# Patient Record
Sex: FEMALE | Race: WHITE
[De-identification: names, ages, dates, MRNs, and addresses within clinical notes are randomized per-mention and may not be internally consistent; named-entity substitution may affect disease eponyms.]

---

## 2019-10-14 ENCOUNTER — HOSPITAL ENCOUNTER (OUTPATIENT)
Dept: HOSPITAL 83 - LAB | Age: 34
Discharge: HOME | End: 2019-10-14
Attending: FAMILY MEDICINE
Payer: COMMERCIAL

## 2019-10-14 DIAGNOSIS — L98.8: ICD-10-CM

## 2019-10-14 DIAGNOSIS — R00.1: ICD-10-CM

## 2019-10-14 DIAGNOSIS — R53.83: ICD-10-CM

## 2019-10-14 DIAGNOSIS — E55.9: Primary | ICD-10-CM

## 2019-10-14 DIAGNOSIS — E78.00: ICD-10-CM

## 2019-10-14 LAB
ALBUMIN SERPL-MCNC: 3.7 GM/DL (ref 3.1–4.5)
ALP SERPL-CCNC: 49 U/L (ref 45–117)
ALT SERPL W P-5'-P-CCNC: 20 U/L (ref 12–78)
AST SERPL-CCNC: 10 IU/L (ref 3–35)
BUN SERPL-MCNC: 13 MG/DL (ref 7–24)
CHLORIDE SERPL-SCNC: 104 MMOL/L (ref 98–107)
CHOLEST SERPL-MCNC: 137 MG/DL (ref ?–200)
CREAT SERPL-MCNC: 0.67 MG/DL (ref 0.55–1.02)
ERYTHROCYTE [DISTWIDTH] IN BLOOD BY AUTOMATED COUNT: 12.3 % (ref 0–14.5)
HCT VFR BLD AUTO: 38.9 % (ref 37–47)
HDLC SERPL-MCNC: 42 MG/DL (ref 40–60)
HGB BLD-MCNC: 12.6 G/DL (ref 12–16)
LDLC SERPL DIRECT ASSAY-MCNC: 86 MG/DL (ref 9–159)
MCH RBC QN AUTO: 28.2 PG (ref 27–31)
MCHC RBC AUTO-ENTMCNC: 32.4 G/DL (ref 33–37)
MCV RBC AUTO: 87 FL (ref 81–99)
NRBC BLD QL AUTO: 0 10*3/UL (ref 0–0)
PLATELET # BLD AUTO: 278 10*3/UL (ref 130–400)
PMV BLD AUTO: 9.7 FL (ref 9.6–12.3)
POTASSIUM SERPL-SCNC: 3.7 MMOL/L (ref 3.5–5.1)
PROT SERPL-MCNC: 7.5 GM/DL (ref 6.4–8.2)
RBC # BLD AUTO: 4.47 10*6/UL (ref 4.1–5.1)
SODIUM SERPL-SCNC: 136 MMOL/L (ref 136–145)
TRIGL SERPL-MCNC: 43 MG/DL (ref ?–150)
TSH SERPL DL<=0.005 MIU/L-ACNC: 1.57 UIU/ML (ref 0.36–4.75)
VLDLC SERPL CALC-MCNC: 9 MG/DL (ref 6–40)
WBC NRBC COR # BLD AUTO: 8.8 10*3/UL (ref 4.8–10.8)

## 2019-10-16 LAB — TESTOST FREE SERPL-MCNC: 1.2 PG/ML (ref 0–4.2)

## 2019-10-30 ENCOUNTER — HOSPITAL ENCOUNTER (OUTPATIENT)
Dept: HOSPITAL 83 - CARD | Age: 34
Discharge: HOME | End: 2019-10-30
Attending: INTERNAL MEDICINE
Payer: COMMERCIAL

## 2019-10-30 DIAGNOSIS — R00.1: Primary | ICD-10-CM

## 2019-12-02 ENCOUNTER — HOSPITAL ENCOUNTER (OUTPATIENT)
Dept: HOSPITAL 83 - US | Age: 34
Discharge: HOME | End: 2019-12-02
Attending: NURSE PRACTITIONER
Payer: COMMERCIAL

## 2019-12-02 DIAGNOSIS — Z34.81: Primary | ICD-10-CM

## 2019-12-02 DIAGNOSIS — Z3A.14: ICD-10-CM

## 2020-01-13 ENCOUNTER — HOSPITAL ENCOUNTER (OUTPATIENT)
Dept: HOSPITAL 83 - US | Age: 35
Discharge: HOME | End: 2020-01-13
Attending: NURSE PRACTITIONER
Payer: COMMERCIAL

## 2020-01-13 DIAGNOSIS — Z34.82: Primary | ICD-10-CM

## 2020-01-13 DIAGNOSIS — Z3A.20: ICD-10-CM

## 2020-05-27 ENCOUNTER — TELEPHONE (OUTPATIENT)
Dept: LABOR AND DELIVERY | Age: 35
End: 2020-05-27

## 2020-05-30 ENCOUNTER — HOSPITAL ENCOUNTER (OUTPATIENT)
Age: 35
Discharge: HOME | End: 2020-06-01
Payer: COMMERCIAL

## 2020-05-30 PROCEDURE — U0003 INFECTIOUS AGENT DETECTION BY NUCLEIC ACID (DNA OR RNA); SEVERE ACUTE RESPIRATORY SYNDROME CORONAVIRUS 2 (SARS-COV-2) (CORONAVIRUS DISEASE [COVID-19]), AMPLIFIED PROBE TECHNIQUE, MAKING USE OF HIGH THROUGHPUT TECHNOLOGIES AS DESCRIBED BY CMS-2020-01-R: HCPCS

## 2020-05-31 ENCOUNTER — HOSPITAL ENCOUNTER (OUTPATIENT)
Age: 35
Discharge: HOME | End: 2020-06-02
Payer: COMMERCIAL

## 2020-06-02 ENCOUNTER — TELEPHONE (OUTPATIENT)
Dept: LABOR AND DELIVERY | Age: 35
End: 2020-06-02

## 2020-06-02 ENCOUNTER — ANESTHESIA EVENT (OUTPATIENT)
Dept: LABOR AND DELIVERY | Age: 35
End: 2020-06-02
Payer: COMMERCIAL

## 2020-06-02 ENCOUNTER — ANESTHESIA (OUTPATIENT)
Dept: LABOR AND DELIVERY | Age: 35
End: 2020-06-02
Payer: COMMERCIAL

## 2020-06-02 ENCOUNTER — HOSPITAL ENCOUNTER (INPATIENT)
Age: 35
LOS: 2 days | Discharge: HOME | End: 2020-06-04
Attending: OBSTETRICS & GYNECOLOGY | Admitting: OBSTETRICS & GYNECOLOGY
Payer: COMMERCIAL

## 2020-06-02 PROBLEM — Z3A.39 39 WEEKS GESTATION OF PREGNANCY: Status: ACTIVE | Noted: 2020-06-02

## 2020-06-02 LAB
ABO/RH: NORMAL
AMPHETAMINE SCREEN, URINE: NOT DETECTED
ANTIBODY SCREEN: NORMAL
BARBITURATE SCREEN URINE: NOT DETECTED
BENZODIAZEPINE SCREEN, URINE: NOT DETECTED
CANNABINOID SCREEN URINE: NOT DETECTED
COCAINE METABOLITE SCREEN URINE: NOT DETECTED
FENTANYL SCREEN, URINE: NOT DETECTED
HCT VFR BLD CALC: 36.9 % (ref 34–48)
HEMOGLOBIN: 12.6 G/DL (ref 11.5–15.5)
Lab: NORMAL
MCH RBC QN AUTO: 30.3 PG (ref 26–35)
MCHC RBC AUTO-ENTMCNC: 34.1 % (ref 32–34.5)
MCV RBC AUTO: 88.7 FL (ref 80–99.9)
METHADONE SCREEN, URINE: NOT DETECTED
OPIATE SCREEN URINE: NOT DETECTED
OXYCODONE URINE: NOT DETECTED
PDW BLD-RTO: 12.9 FL (ref 11.5–15)
PHENCYCLIDINE SCREEN URINE: NOT DETECTED
PLATELET # BLD: 218 E9/L (ref 130–450)
PMV BLD AUTO: 11 FL (ref 7–12)
RBC # BLD: 4.16 E12/L (ref 3.5–5.5)
WBC # BLD: 13 E9/L (ref 4.5–11.5)

## 2020-06-02 PROCEDURE — 86900 BLOOD TYPING SEROLOGIC ABO: CPT

## 2020-06-02 PROCEDURE — 86850 RBC ANTIBODY SCREEN: CPT

## 2020-06-02 PROCEDURE — 36415 COLL VENOUS BLD VENIPUNCTURE: CPT

## 2020-06-02 PROCEDURE — 6370000000 HC RX 637 (ALT 250 FOR IP): Performed by: OBSTETRICS & GYNECOLOGY

## 2020-06-02 PROCEDURE — 85027 COMPLETE CBC AUTOMATED: CPT

## 2020-06-02 PROCEDURE — 80307 DRUG TEST PRSMV CHEM ANLYZR: CPT

## 2020-06-02 PROCEDURE — 3E033VJ INTRODUCTION OF OTHER HORMONE INTO PERIPHERAL VEIN, PERCUTANEOUS APPROACH: ICD-10-PCS | Performed by: OBSTETRICS & GYNECOLOGY

## 2020-06-02 PROCEDURE — 1220000001 HC SEMI PRIVATE L&D R&B

## 2020-06-02 PROCEDURE — 2580000003 HC RX 258: Performed by: OBSTETRICS & GYNECOLOGY

## 2020-06-02 PROCEDURE — 86901 BLOOD TYPING SEROLOGIC RH(D): CPT

## 2020-06-02 RX ORDER — ONDANSETRON 2 MG/ML
4 INJECTION INTRAMUSCULAR; INTRAVENOUS EVERY 6 HOURS PRN
Status: DISCONTINUED | OUTPATIENT
Start: 2020-06-02 | End: 2020-06-04 | Stop reason: HOSPADM

## 2020-06-02 RX ORDER — MULTIVIT-MIN/IRON/FOLIC ACID/K 18-600-40
CAPSULE ORAL
COMMUNITY

## 2020-06-02 RX ORDER — SODIUM CHLORIDE, SODIUM LACTATE, POTASSIUM CHLORIDE, CALCIUM CHLORIDE 600; 310; 30; 20 MG/100ML; MG/100ML; MG/100ML; MG/100ML
INJECTION, SOLUTION INTRAVENOUS CONTINUOUS
Status: DISCONTINUED | OUTPATIENT
Start: 2020-06-02 | End: 2020-06-04 | Stop reason: HOSPADM

## 2020-06-02 RX ADMIN — Medication 25 MCG: at 22:00

## 2020-06-02 RX ADMIN — SODIUM CHLORIDE, POTASSIUM CHLORIDE, SODIUM LACTATE AND CALCIUM CHLORIDE: 600; 310; 30; 20 INJECTION, SOLUTION INTRAVENOUS at 21:30

## 2020-06-02 ASSESSMENT — LIFESTYLE VARIABLES: SMOKING_STATUS: 1

## 2020-06-02 NOTE — TELEPHONE ENCOUNTER
[facility] [employer] office. I talked to [facility] at the . She stated that the [facility] office was unaware of the pre covid19 testing. She stated Ashu's other office was probably aware, but not Valerie Prophet. I informed her of the testing required and that all c-sections needs this done 4 days prior to the surgery, will need to give everyone a order to get this done. She is stated will fax me a order today for this patient.

## 2020-06-03 PROCEDURE — 2580000003 HC RX 258: Performed by: OBSTETRICS & GYNECOLOGY

## 2020-06-03 PROCEDURE — 6360000002 HC RX W HCPCS: Performed by: OBSTETRICS & GYNECOLOGY

## 2020-06-03 PROCEDURE — 2500000003 HC RX 250 WO HCPCS: Performed by: ANESTHESIOLOGY

## 2020-06-03 PROCEDURE — 3700000025 EPIDURAL BLOCK: Performed by: ANESTHESIOLOGY

## 2020-06-03 PROCEDURE — 6370000000 HC RX 637 (ALT 250 FOR IP): Performed by: OBSTETRICS & GYNECOLOGY

## 2020-06-03 PROCEDURE — 2500000003 HC RX 250 WO HCPCS: Performed by: REGISTERED NURSE

## 2020-06-03 PROCEDURE — 7200000001 HC VAGINAL DELIVERY

## 2020-06-03 PROCEDURE — 1220000000 HC SEMI PRIVATE OB R&B

## 2020-06-03 RX ORDER — IBUPROFEN 800 MG/1
800 TABLET ORAL EVERY 8 HOURS
Status: DISCONTINUED | OUTPATIENT
Start: 2020-06-04 | End: 2020-06-04 | Stop reason: HOSPADM

## 2020-06-03 RX ORDER — SIMETHICONE 80 MG
80 TABLET,CHEWABLE ORAL EVERY 6 HOURS PRN
Status: DISCONTINUED | OUTPATIENT
Start: 2020-06-03 | End: 2020-06-04 | Stop reason: HOSPADM

## 2020-06-03 RX ORDER — EPHEDRINE SULFATE/0.9% NACL/PF 50 MG/5 ML
5 SYRINGE (ML) INTRAVENOUS PRN
Status: DISCONTINUED | OUTPATIENT
Start: 2020-06-03 | End: 2020-06-04 | Stop reason: HOSPADM

## 2020-06-03 RX ORDER — ONDANSETRON 2 MG/ML
4 INJECTION INTRAMUSCULAR; INTRAVENOUS EVERY 6 HOURS PRN
Status: DISCONTINUED | OUTPATIENT
Start: 2020-06-03 | End: 2020-06-03 | Stop reason: HOSPADM

## 2020-06-03 RX ORDER — NALOXONE HYDROCHLORIDE 0.4 MG/ML
0.4 INJECTION, SOLUTION INTRAMUSCULAR; INTRAVENOUS; SUBCUTANEOUS PRN
Status: DISCONTINUED | OUTPATIENT
Start: 2020-06-03 | End: 2020-06-03 | Stop reason: HOSPADM

## 2020-06-03 RX ORDER — SODIUM CHLORIDE 0.9 % (FLUSH) 0.9 %
10 SYRINGE (ML) INJECTION EVERY 12 HOURS SCHEDULED
Status: DISCONTINUED | OUTPATIENT
Start: 2020-06-03 | End: 2020-06-04 | Stop reason: HOSPADM

## 2020-06-03 RX ORDER — SODIUM CHLORIDE 0.9 % (FLUSH) 0.9 %
10 SYRINGE (ML) INJECTION PRN
Status: DISCONTINUED | OUTPATIENT
Start: 2020-06-03 | End: 2020-06-04 | Stop reason: HOSPADM

## 2020-06-03 RX ORDER — ONDANSETRON 2 MG/ML
4 INJECTION INTRAMUSCULAR; INTRAVENOUS EVERY 6 HOURS PRN
Status: DISCONTINUED | OUTPATIENT
Start: 2020-06-03 | End: 2020-06-04 | Stop reason: HOSPADM

## 2020-06-03 RX ORDER — LIDOCAINE HYDROCHLORIDE 10 MG/ML
INJECTION, SOLUTION INFILTRATION; PERINEURAL
Status: DISPENSED
Start: 2020-06-03 | End: 2020-06-03

## 2020-06-03 RX ORDER — ACETAMINOPHEN 325 MG/1
650 TABLET ORAL EVERY 4 HOURS PRN
Status: DISCONTINUED | OUTPATIENT
Start: 2020-06-03 | End: 2020-06-04 | Stop reason: HOSPADM

## 2020-06-03 RX ORDER — FERROUS SULFATE 325(65) MG
325 TABLET ORAL 2 TIMES DAILY WITH MEALS
Status: DISCONTINUED | OUTPATIENT
Start: 2020-06-03 | End: 2020-06-04 | Stop reason: HOSPADM

## 2020-06-03 RX ORDER — SODIUM CHLORIDE, SODIUM LACTATE, POTASSIUM CHLORIDE, CALCIUM CHLORIDE 600; 310; 30; 20 MG/100ML; MG/100ML; MG/100ML; MG/100ML
INJECTION, SOLUTION INTRAVENOUS CONTINUOUS
Status: DISCONTINUED | OUTPATIENT
Start: 2020-06-03 | End: 2020-06-04 | Stop reason: HOSPADM

## 2020-06-03 RX ORDER — HYDROCODONE BITARTRATE AND ACETAMINOPHEN 5; 325 MG/1; MG/1
2 TABLET ORAL EVERY 4 HOURS PRN
Status: DISCONTINUED | OUTPATIENT
Start: 2020-06-03 | End: 2020-06-04 | Stop reason: HOSPADM

## 2020-06-03 RX ORDER — LIDOCAINE HYDROCHLORIDE AND EPINEPHRINE 15; 5 MG/ML; UG/ML
INJECTION, SOLUTION EPIDURAL PRN
Status: DISCONTINUED | OUTPATIENT
Start: 2020-06-03 | End: 2020-06-03 | Stop reason: SDUPTHER

## 2020-06-03 RX ORDER — BISACODYL 10 MG
10 SUPPOSITORY, RECTAL RECTAL DAILY PRN
Status: DISCONTINUED | OUTPATIENT
Start: 2020-06-03 | End: 2020-06-04 | Stop reason: HOSPADM

## 2020-06-03 RX ORDER — HYDROCODONE BITARTRATE AND ACETAMINOPHEN 5; 325 MG/1; MG/1
1 TABLET ORAL EVERY 4 HOURS PRN
Status: DISCONTINUED | OUTPATIENT
Start: 2020-06-03 | End: 2020-06-04 | Stop reason: HOSPADM

## 2020-06-03 RX ORDER — ONDANSETRON 4 MG/1
8 TABLET, FILM COATED ORAL EVERY 8 HOURS PRN
Status: DISCONTINUED | OUTPATIENT
Start: 2020-06-03 | End: 2020-06-04 | Stop reason: HOSPADM

## 2020-06-03 RX ORDER — NALBUPHINE HCL 10 MG/ML
5 AMPUL (ML) INJECTION EVERY 4 HOURS PRN
Status: DISCONTINUED | OUTPATIENT
Start: 2020-06-03 | End: 2020-06-03 | Stop reason: RX

## 2020-06-03 RX ORDER — ACETAMINOPHEN 650 MG
TABLET, EXTENDED RELEASE ORAL
Status: DISPENSED
Start: 2020-06-03 | End: 2020-06-03

## 2020-06-03 RX ORDER — DOCUSATE SODIUM 100 MG/1
100 CAPSULE, LIQUID FILLED ORAL 2 TIMES DAILY
Status: DISCONTINUED | OUTPATIENT
Start: 2020-06-03 | End: 2020-06-04 | Stop reason: HOSPADM

## 2020-06-03 RX ORDER — MODIFIED LANOLIN
OINTMENT (GRAM) TOPICAL PRN
Status: DISCONTINUED | OUTPATIENT
Start: 2020-06-03 | End: 2020-06-04 | Stop reason: HOSPADM

## 2020-06-03 RX ADMIN — Medication 1 MILLI-UNITS/MIN: at 06:05

## 2020-06-03 RX ADMIN — Medication 15 ML/HR: at 10:46

## 2020-06-03 RX ADMIN — Medication 10 ML: at 20:02

## 2020-06-03 RX ADMIN — Medication 5 ML: at 10:47

## 2020-06-03 RX ADMIN — IBUPROFEN 800 MG: 800 TABLET, FILM COATED ORAL at 23:27

## 2020-06-03 RX ADMIN — BUTORPHANOL TARTRATE 2 MG: 2 INJECTION, SOLUTION INTRAMUSCULAR; INTRAVENOUS at 08:14

## 2020-06-03 RX ADMIN — Medication 25 MCG: at 02:00

## 2020-06-03 RX ADMIN — Medication 10 ML: at 10:44

## 2020-06-03 RX ADMIN — DOCUSATE SODIUM 100 MG: 100 CAPSULE, LIQUID FILLED ORAL at 20:03

## 2020-06-03 RX ADMIN — LIDOCAINE HYDROCHLORIDE,EPINEPHRINE BITARTRATE 3 ML: 15; .005 INJECTION, SOLUTION EPIDURAL; INFILTRATION; INTRACAUDAL; PERINEURAL at 10:42

## 2020-06-03 ASSESSMENT — PAIN DESCRIPTION - FREQUENCY: FREQUENCY: INTERMITTENT

## 2020-06-03 ASSESSMENT — PAIN DESCRIPTION - DESCRIPTORS
DESCRIPTORS: CRAMPING
DESCRIPTORS: CRAMPING

## 2020-06-03 ASSESSMENT — PAIN DESCRIPTION - ORIENTATION: ORIENTATION: LOWER

## 2020-06-03 ASSESSMENT — PAIN SCALES - GENERAL
PAINLEVEL_OUTOF10: 7
PAINLEVEL_OUTOF10: 0
PAINLEVEL_OUTOF10: 3

## 2020-06-03 ASSESSMENT — PAIN DESCRIPTION - PROGRESSION: CLINICAL_PROGRESSION: GRADUALLY WORSENING

## 2020-06-03 ASSESSMENT — PAIN DESCRIPTION - LOCATION: LOCATION: ABDOMEN

## 2020-06-03 ASSESSMENT — PAIN DESCRIPTION - PAIN TYPE: TYPE: ACUTE PAIN

## 2020-06-03 NOTE — H&P
HISTORY OF PRESENT ILLNESS:      The patient is a 29 y.o. female at 36w2d. OB History        2    Para   1    Term   1            AB        Living   1       SAB        TAB        Ectopic        Molar        Multiple        Live Births   1            Patient presents with a chief complaint as above and is being admitted for induction    Estimated Due Date: Estimated Date of Delivery: 20    PRENATAL CARE:    Complicated by: none    PAST OB HISTORY  OB History        2    Para   1    Term   1            AB        Living   1       SAB        TAB        Ectopic        Molar        Multiple        Live Births   1                Past Medical History:    History reviewed. No pertinent past medical history. Past Surgical History:        Procedure Laterality Date    TONSILLECTOMY       Allergies:  Patient has no known allergies.   Social History:    Social History     Socioeconomic History    Marital status:      Spouse name: Not on file    Number of children: Not on file    Years of education: Not on file    Highest education level: Not on file   Occupational History    Not on file   Social Needs    Financial resource strain: Not on file    Food insecurity     Worry: Not on file     Inability: Not on file    Transportation needs     Medical: Not on file     Non-medical: Not on file   Tobacco Use    Smoking status: Former Smoker    Smokeless tobacco: Never Used   Substance and Sexual Activity    Alcohol use: Not Currently    Drug use: Never    Sexual activity: Yes     Partners: Male   Lifestyle    Physical activity     Days per week: Not on file     Minutes per session: Not on file    Stress: Not on file   Relationships    Social connections     Talks on phone: Not on file     Gets together: Not on file     Attends [employer] service: Not on file     Active member of club or organization: Not on file     Attends meetings of clubs or organizations: Not on file

## 2020-06-03 NOTE — ANESTHESIA PRE PROCEDURE
Department of Anesthesiology  Preprocedure Note       Name:  Ariana Ortiz   Age:  29 y.o.  :  1985                                          MRN:  [id number]         Date:  2020      Surgeon: * Surgery not found *    Procedure:     Medications prior to admission:   Prior to Admission medications    Medication Sig Start Date End Date Taking? Authorizing Provider   Cholecalciferol (VITAMIN D) 50 MCG ( UT) CAPS capsule Take by mouth   Yes Historical Provider, MD   Prenatal MV-Min-Fe Fum-FA-DHA (PRENATAL 1 PO) Take by mouth   Yes Historical Provider, MD       Current medications:    Current Facility-Administered Medications   Medication Dose Route Frequency Provider Last Rate Last Dose    lactated ringers infusion   Intravenous Continuous Mallie Ee, DO        ondansetron [facility] injection 4 mg  4 mg Intravenous Q6H PRN Mallie Ee, DO        oxytocin (PITOCIN) 30 units in 500 mL infusion  1 kory-units/min Intravenous Continuous PRN Mallie Ee, DO           Allergies:  No Known Allergies    Problem List:    Patient Active Problem List   Diagnosis Code    39 weeks gestation of pregnancy Z3A.39       Past Medical History:  History reviewed. No pertinent past medical history.     Past Surgical History:        Procedure Laterality Date    TONSILLECTOMY         Social History:    Social History     Tobacco Use    Smoking status: Former Smoker    Smokeless tobacco: Never Used   Substance Use Topics    Alcohol use: Not Currently                                Counseling given: Not Answered      Vital Signs (Current):   Vitals:    20 2121   BP: 126/68   Pulse: 50   Resp: 18   Temp: 36.7 °C (98.1 °F)   TempSrc: Oral                                              BP Readings from Last 3 Encounters:   20 126/68       NPO Status:                                                                                 BMI:   Wt Readings from Last 3 Encounters:   No data found for [employer]

## 2020-06-03 NOTE — PROGRESS NOTES
Dr. Desiree Scales called in, updated on FHTs and ctx pattern, states to check patient at 0600 and call with cervical check before next cytotec.

## 2020-06-03 NOTE — PROGRESS NOTES
Patient oriented to room 313, call light and telephone usage shown to the patient. New admission vital signs and assessment completed as charted. New admission paperwork gone over with the patient including the TDAP and Hepatitis B vaccines. The patient is refusing the TDAP vaccine as she has already had it, and consented to the baby to receiving the Hepatitis B vaccine. Instructed the patient to call for first time out of bed. All questions answered, will monitor.

## 2020-06-04 VITALS
RESPIRATION RATE: 18 BRPM | TEMPERATURE: 98 F | WEIGHT: 220 LBS | BODY MASS INDEX: 34.53 KG/M2 | SYSTOLIC BLOOD PRESSURE: 123 MMHG | HEART RATE: 55 BPM | DIASTOLIC BLOOD PRESSURE: 60 MMHG | HEIGHT: 67 IN

## 2020-06-04 LAB
HCT VFR BLD CALC: 36.9 % (ref 34–48)
HEMOGLOBIN: 12.2 G/DL (ref 11.5–15.5)
SARS-COV-2: NOT DETECTED
SOURCE: NORMAL

## 2020-06-04 PROCEDURE — 36415 COLL VENOUS BLD VENIPUNCTURE: CPT

## 2020-06-04 PROCEDURE — 85018 HEMOGLOBIN: CPT

## 2020-06-04 PROCEDURE — 85014 HEMATOCRIT: CPT

## 2020-06-04 PROCEDURE — 6370000000 HC RX 637 (ALT 250 FOR IP): Performed by: OBSTETRICS & GYNECOLOGY

## 2020-06-04 RX ORDER — IBUPROFEN 800 MG/1
800 TABLET ORAL EVERY 8 HOURS PRN
Qty: 24 TABLET | Refills: 0 | Status: SHIPPED | OUTPATIENT
Start: 2020-06-04

## 2020-06-04 RX ADMIN — IBUPROFEN 800 MG: 800 TABLET, FILM COATED ORAL at 10:38

## 2020-06-04 RX ADMIN — DOCUSATE SODIUM 100 MG: 100 CAPSULE, LIQUID FILLED ORAL at 10:37

## 2020-06-04 ASSESSMENT — PAIN SCALES - GENERAL: PAINLEVEL_OUTOF10: 1

## 2020-06-04 NOTE — PROGRESS NOTES
CLINICAL PHARMACY NOTE: MEDS TO [address] Select Patient?: No  Total # of Prescriptions Filled: 1   The following medications were delivered to the patient:  · Ibuprofen 800 mg     Total # of Interventions Completed: 2  Time Spent (min): 30    Additional Documentation:

## 2021-03-11 ENCOUNTER — HOSPITAL ENCOUNTER (OUTPATIENT)
Dept: HOSPITAL 83 - LAB | Age: 36
Discharge: HOME | End: 2021-03-11
Attending: FAMILY MEDICINE
Payer: COMMERCIAL

## 2021-03-11 DIAGNOSIS — E55.9: ICD-10-CM

## 2021-03-11 DIAGNOSIS — R53.83: ICD-10-CM

## 2021-03-11 DIAGNOSIS — R63.5: ICD-10-CM

## 2021-03-11 DIAGNOSIS — E78.00: Primary | ICD-10-CM

## 2021-03-11 LAB
25(OH)D3 SERPL-MCNC: 25.6 NG/ML (ref 30–100)
ALBUMIN SERPL-MCNC: 3.3 GM/DL (ref 3.1–4.5)
ALP SERPL-CCNC: 55 U/L (ref 45–117)
ALT SERPL W P-5'-P-CCNC: 15 U/L (ref 12–78)
AST SERPL-CCNC: 6 IU/L (ref 3–35)
BUN SERPL-MCNC: 15 MG/DL (ref 7–24)
CHLORIDE SERPL-SCNC: 109 MMOL/L (ref 98–107)
CHOLEST SERPL-MCNC: 182 MG/DL (ref ?–200)
CREAT SERPL-MCNC: 0.82 MG/DL (ref 0.55–1.02)
ERYTHROCYTE [DISTWIDTH] IN BLOOD BY AUTOMATED COUNT: 11.7 % (ref 0–14.5)
HCT VFR BLD AUTO: 38.7 % (ref 37–47)
HDLC SERPL-MCNC: 49 MG/DL (ref 40–60)
LDLC SERPL DIRECT ASSAY-MCNC: 108 MG/DL (ref 9–159)
MCH RBC QN AUTO: 28.4 PG (ref 27–31)
MCHC RBC AUTO-ENTMCNC: 32.8 G/DL (ref 33–37)
MCV RBC AUTO: 86.6 FL (ref 81–99)
NRBC BLD QL AUTO: 0 % (ref 0–0)
PLATELET # BLD AUTO: 298 10*3/UL (ref 130–400)
PMV BLD AUTO: 9.5 FL (ref 9.6–12.3)
POTASSIUM SERPL-SCNC: 3.9 MMOL/L (ref 3.5–5.1)
PROT SERPL-MCNC: 7.5 GM/DL (ref 6.4–8.2)
RBC # BLD AUTO: 4.47 10*6/UL (ref 4.1–5.1)
SODIUM SERPL-SCNC: 141 MMOL/L (ref 136–145)
T4 FREE SERPL-MCNC: 0.93 NG/DL (ref 0.76–1.46)
TRIGL SERPL-MCNC: 125 MG/DL (ref ?–150)
TSH SERPL DL<=0.005 MIU/L-ACNC: 2.3 UIU/ML (ref 0.36–4.75)
VITAMIN B12: 411 PG/ML (ref 247–911)
VLDLC SERPL CALC-MCNC: 25 MG/DL (ref 6–40)
WBC NRBC COR # BLD AUTO: 8.4 10*3/UL (ref 4.8–10.8)

## 2022-04-21 ENCOUNTER — HOSPITAL ENCOUNTER (OUTPATIENT)
Dept: HOSPITAL 83 - RAD | Age: 37
Discharge: HOME | End: 2022-04-21
Attending: NURSE PRACTITIONER
Payer: COMMERCIAL

## 2022-04-21 DIAGNOSIS — R00.1: Primary | ICD-10-CM

## 2022-04-21 DIAGNOSIS — M41.84: ICD-10-CM

## 2022-04-21 DIAGNOSIS — Z82.49: ICD-10-CM

## 2022-06-01 ENCOUNTER — HOSPITAL ENCOUNTER (OUTPATIENT)
Dept: HOSPITAL 83 - CARD | Age: 37
Discharge: HOME | End: 2022-06-01
Attending: NURSE PRACTITIONER
Payer: COMMERCIAL

## 2022-06-01 DIAGNOSIS — I08.2: Primary | ICD-10-CM

## 2022-06-01 DIAGNOSIS — Z82.49: ICD-10-CM

## 2022-10-20 LAB — PAP SMEAR, EXTERNAL: NORMAL

## 2023-11-07 ENCOUNTER — HOSPITAL ENCOUNTER (OUTPATIENT)
Dept: HOSPITAL 83 - LAB | Age: 38
Discharge: HOME | End: 2023-11-07
Attending: NURSE PRACTITIONER
Payer: COMMERCIAL

## 2023-11-07 DIAGNOSIS — R05.8: ICD-10-CM

## 2023-11-07 DIAGNOSIS — J18.9: Primary | ICD-10-CM

## 2023-11-18 ENCOUNTER — HOSPITAL ENCOUNTER (OUTPATIENT)
Dept: HOSPITAL 83 - LAB | Age: 38
Discharge: HOME | End: 2023-11-18
Attending: NURSE PRACTITIONER
Payer: COMMERCIAL

## 2023-11-18 DIAGNOSIS — Z83.3: ICD-10-CM

## 2023-11-18 DIAGNOSIS — Z00.01: ICD-10-CM

## 2023-11-18 DIAGNOSIS — Z13.1: ICD-10-CM

## 2023-11-18 DIAGNOSIS — Z13.220: Primary | ICD-10-CM

## 2023-11-18 LAB
ALP SERPL-CCNC: 58 U/L (ref 46–116)
ALT SERPL W P-5'-P-CCNC: 20 U/L (ref 5–49)
BASOPHILS # BLD AUTO: 0.1 10*3/UL (ref 0–0.1)
BASOPHILS NFR BLD AUTO: 0.5 % (ref 0–1)
BUN SERPL-MCNC: 11 MG/DL (ref 9–23)
CHLORIDE SERPL-SCNC: 106 MMOL/L (ref 98–107)
CHOLEST SERPL-MCNC: 179 MG/DL (ref ?–200)
EOSINOPHIL # BLD AUTO: 0.2 10*3/UL (ref 0–0.4)
EOSINOPHIL # BLD AUTO: 2.4 % (ref 1–4)
ERYTHROCYTE [DISTWIDTH] IN BLOOD BY AUTOMATED COUNT: 12.9 % (ref 0–14.5)
HCT VFR BLD AUTO: 41 % (ref 37–47)
LDLC SERPL DIRECT ASSAY-MCNC: 111 MG/DL (ref 9–159)
LYMPHOCYTES # BLD AUTO: 4.5 10*3/UL (ref 1.3–4.4)
LYMPHOCYTES NFR BLD AUTO: 44.3 % (ref 27–41)
MCH RBC QN AUTO: 27.5 PG (ref 27–31)
MCHC RBC AUTO-ENTMCNC: 31.2 G/DL (ref 33–37)
MCV RBC AUTO: 88.2 FL (ref 81–99)
MONOCYTES # BLD AUTO: 0.7 10*3/UL (ref 0.1–1)
MONOCYTES NFR BLD MANUAL: 6.9 % (ref 3–9)
NEUT #: 4.6 10*3/UL (ref 2.3–7.9)
NEUT %: 45.6 % (ref 47–73)
NRBC BLD QL AUTO: 0 10*3/UL (ref 0–0)
PLATELET # BLD AUTO: 316 10*3/UL (ref 130–400)
PMV BLD AUTO: 9.2 FL (ref 9.6–12.3)
POTASSIUM SERPL-SCNC: 4.1 MMOL/L (ref 3.4–5.1)
PROT SERPL-MCNC: 6.7 GM/DL (ref 6–8)
RBC # BLD AUTO: 4.65 10*6/UL (ref 4.1–5.1)
TRIGL SERPL-MCNC: 113 MG/DL (ref ?–150)
WBC NRBC COR # BLD AUTO: 10.1 10*3/UL (ref 4.8–10.8)

## 2025-01-13 SDOH — HEALTH STABILITY: PHYSICAL HEALTH: ON AVERAGE, HOW MANY DAYS PER WEEK DO YOU ENGAGE IN MODERATE TO STRENUOUS EXERCISE (LIKE A BRISK WALK)?: 0 DAYS

## 2025-01-13 SDOH — HEALTH STABILITY: PHYSICAL HEALTH: ON AVERAGE, HOW MANY MINUTES DO YOU ENGAGE IN EXERCISE AT THIS LEVEL?: 0 MIN

## 2025-01-14 ENCOUNTER — OFFICE VISIT (OUTPATIENT)
Dept: PRIMARY CARE CLINIC | Age: 40
End: 2025-01-14
Payer: COMMERCIAL

## 2025-01-14 VITALS
TEMPERATURE: 98.1 F | OXYGEN SATURATION: 99 % | HEIGHT: 67 IN | BODY MASS INDEX: 35.31 KG/M2 | DIASTOLIC BLOOD PRESSURE: 70 MMHG | SYSTOLIC BLOOD PRESSURE: 128 MMHG | WEIGHT: 225 LBS | HEART RATE: 76 BPM

## 2025-01-14 DIAGNOSIS — Z00.00 ADULT GENERAL MEDICAL EXAM: ICD-10-CM

## 2025-01-14 DIAGNOSIS — Z00.00 ADULT GENERAL MEDICAL EXAM: Primary | ICD-10-CM

## 2025-01-14 PROBLEM — U07.1 COVID-19: Status: ACTIVE | Noted: 2025-01-14

## 2025-01-14 PROBLEM — Z3A.39 39 WEEKS GESTATION OF PREGNANCY: Status: RESOLVED | Noted: 2020-06-02 | Resolved: 2025-01-14

## 2025-01-14 PROBLEM — E66.3 OVERWEIGHT: Status: ACTIVE | Noted: 2025-01-14

## 2025-01-14 PROBLEM — R93.1 ABNORMAL FINDING ON ECHOCARDIOGRAM: Status: ACTIVE | Noted: 2022-07-02

## 2025-01-14 PROBLEM — G43.909 MIGRAINE: Status: ACTIVE | Noted: 2025-01-14

## 2025-01-14 LAB
ALBUMIN: 4.2 G/DL (ref 3.5–5.2)
ALP BLD-CCNC: 62 U/L (ref 35–104)
ALT SERPL-CCNC: 11 U/L (ref 0–32)
ANION GAP SERPL CALCULATED.3IONS-SCNC: 13 MMOL/L (ref 7–16)
AST SERPL-CCNC: 15 U/L (ref 0–31)
BACTERIA: ABNORMAL
BASOPHILS ABSOLUTE: 0.06 K/UL (ref 0–0.2)
BASOPHILS RELATIVE PERCENT: 1 % (ref 0–2)
BILIRUB SERPL-MCNC: 0.3 MG/DL (ref 0–1.2)
BILIRUBIN DIRECT: <0.2 MG/DL (ref 0–0.3)
BILIRUBIN, INDIRECT: NORMAL MG/DL (ref 0–1)
BILIRUBIN, URINE: NEGATIVE
BUN BLDV-MCNC: 14 MG/DL (ref 6–20)
CALCIUM SERPL-MCNC: 9.6 MG/DL (ref 8.6–10.2)
CHLORIDE BLD-SCNC: 102 MMOL/L (ref 98–107)
CHOLESTEROL, TOTAL: 176 MG/DL
CO2: 24 MMOL/L (ref 22–29)
COLOR, UA: YELLOW
CREAT SERPL-MCNC: 0.9 MG/DL (ref 0.5–1)
CREATININE URINE: 80.2 MG/DL (ref 29–226)
EOSINOPHILS ABSOLUTE: 0.4 K/UL (ref 0.05–0.5)
EOSINOPHILS RELATIVE PERCENT: 4 % (ref 0–6)
GFR, ESTIMATED: 87 ML/MIN/1.73M2
GLUCOSE BLD-MCNC: 92 MG/DL (ref 74–99)
GLUCOSE URINE: NEGATIVE MG/DL
HBA1C MFR BLD: 5.3 % (ref 4–5.6)
HCT VFR BLD CALC: 38.2 % (ref 34–48)
HDLC SERPL-MCNC: 46 MG/DL
HEMOGLOBIN: 12.3 G/DL (ref 11.5–15.5)
IMMATURE GRANULOCYTES %: 0 % (ref 0–5)
IMMATURE GRANULOCYTES ABSOLUTE: 0.04 K/UL (ref 0–0.58)
KETONES, URINE: NEGATIVE MG/DL
LDL CHOLESTEROL: 108 MG/DL
LEUKOCYTE ESTERASE, URINE: ABNORMAL
LYMPHOCYTES ABSOLUTE: 2.75 K/UL (ref 1.5–4)
LYMPHOCYTES RELATIVE PERCENT: 28 % (ref 20–42)
MCH RBC QN AUTO: 26.6 PG (ref 26–35)
MCHC RBC AUTO-ENTMCNC: 32.2 G/DL (ref 32–34.5)
MCV RBC AUTO: 82.7 FL (ref 80–99.9)
MICROALBUMIN/CREAT 24H UR: <12 MG/L (ref 0–19)
MICROALBUMIN/CREAT UR-RTO: NORMAL MCG/MG CREAT (ref 0–30)
MONOCYTES ABSOLUTE: 0.63 K/UL (ref 0.1–0.95)
MONOCYTES RELATIVE PERCENT: 6 % (ref 2–12)
NEUTROPHILS ABSOLUTE: 6.08 K/UL (ref 1.8–7.3)
NEUTROPHILS RELATIVE PERCENT: 61 % (ref 43–80)
NITRITE, URINE: NEGATIVE
PDW BLD-RTO: 13.4 % (ref 11.5–15)
PH, URINE: 7 (ref 5–9)
PLATELET # BLD: 383 K/UL (ref 130–450)
PMV BLD AUTO: 9.6 FL (ref 7–12)
POTASSIUM SERPL-SCNC: 4.4 MMOL/L (ref 3.5–5)
PROTEIN UA: NEGATIVE MG/DL
RBC # BLD: 4.62 M/UL (ref 3.5–5.5)
RBC UA: ABNORMAL /HPF
SODIUM BLD-SCNC: 139 MMOL/L (ref 132–146)
SPECIFIC GRAVITY UA: 1.01 (ref 1–1.03)
T4 FREE: 1 NG/DL (ref 0.9–1.7)
TOTAL PROTEIN: 7.7 G/DL (ref 6.4–8.3)
TRIGL SERPL-MCNC: 108 MG/DL
TSH SERPL DL<=0.05 MIU/L-ACNC: 1.72 UIU/ML (ref 0.27–4.2)
TURBIDITY: CLEAR
URIC ACID: 4.7 MG/DL (ref 2.4–5.7)
URINE HGB: NEGATIVE
UROBILINOGEN, URINE: 0.2 EU/DL (ref 0–1)
VITAMIN D 25-HYDROXY: 24 NG/ML (ref 30–100)
VLDLC SERPL CALC-MCNC: 22 MG/DL
WBC # BLD: 10 K/UL (ref 4.5–11.5)
WBC UA: ABNORMAL /HPF

## 2025-01-14 PROCEDURE — 99385 PREV VISIT NEW AGE 18-39: CPT | Performed by: FAMILY MEDICINE

## 2025-01-14 RX ORDER — ONDANSETRON 4 MG/1
4 TABLET, FILM COATED ORAL 3 TIMES DAILY PRN
Qty: 30 TABLET | Refills: 0 | Status: SHIPPED | OUTPATIENT
Start: 2025-01-14

## 2025-01-14 RX ORDER — NORGESTIMATE AND ETHINYL ESTRADIOL 7DAYSX3 28
KIT ORAL
COMMUNITY
End: 2025-01-14 | Stop reason: ALTCHOICE

## 2025-01-14 RX ORDER — NORGESTIMATE AND ETHINYL ESTRADIOL 7DAYSX3 28
1 KIT ORAL DAILY
COMMUNITY

## 2025-01-14 RX ORDER — SUMATRIPTAN SUCCINATE 25 MG/1
25 TABLET ORAL PRN
Qty: 27 TABLET | Refills: 1 | Status: SHIPPED | OUTPATIENT
Start: 2025-01-14

## 2025-01-14 ASSESSMENT — PATIENT HEALTH QUESTIONNAIRE - PHQ9
SUM OF ALL RESPONSES TO PHQ QUESTIONS 1-9: 0
SUM OF ALL RESPONSES TO PHQ9 QUESTIONS 1 & 2: 0
SUM OF ALL RESPONSES TO PHQ QUESTIONS 1-9: 0
SUM OF ALL RESPONSES TO PHQ QUESTIONS 1-9: 0
2. FEELING DOWN, DEPRESSED OR HOPELESS: NOT AT ALL
1. LITTLE INTEREST OR PLEASURE IN DOING THINGS: NOT AT ALL
SUM OF ALL RESPONSES TO PHQ QUESTIONS 1-9: 0

## 2025-01-14 ASSESSMENT — ENCOUNTER SYMPTOMS
CONSTIPATION: 0
BLOOD IN STOOL: 0
VOMITING: 0
SHORTNESS OF BREATH: 0
SORE THROAT: 0
ABDOMINAL PAIN: 0
DIARRHEA: 0
COUGH: 0
NAUSEA: 0
PHOTOPHOBIA: 0
BACK PAIN: 0

## 2025-01-14 NOTE — PROGRESS NOTES
Edna Gonzalez (:  1985) is a 39 y.o. female,New patient, here for evaluation of the following chief complaint(s):  New Patient, Establish Care, Headache (Believes migraines, having nausea with headaches, usually right before menstrual cycle ), and Annual Exam         Assessment & Plan  Adult general medical exam  At this time we will order baseline labs as she has not had any in quite some time.  Further evaluation based on results.  Continue to follow with OB for cervical cancer screening.  We did discuss the need for colon cancer screening given her mother's history of colon cancer.  She will be due sometime in the fall.  She will notify us whenever she would like to pursue this.  Will start her on sumatriptan hand and Zofran for her catamenial migraines.  May need further workup if no improvement.    Orders:    CBC with Auto Differential; Future    T4, Free; Future    Uric Acid; Future    TSH; Future    Hepatic Function Panel; Future    Basic Metabolic Panel; Future    Lipid Panel; Future    Hemoglobin A1C; Future    Urinalysis with Microscopic; Future    Albumin/Creatinine Ratio, Urine; Future    Vitamin D 25 Hydroxy; Future    SUMAtriptan (IMITREX) 25 MG tablet; Take 1 tablet by mouth as needed for Migraine    ondansetron (ZOFRAN) 4 MG tablet; Take 1 tablet by mouth 3 times daily as needed for Nausea or Vomiting      Return in about 1 year (around 2026).       Subjective   HPI  Patient presents today to establish with new PCP.  Previous PCP left the practice and she wished to start with a new physician.  Past medical history significant for endometriosis and PCOS.  Patient has been on OCPs long-term.  Her only issue today has been noticed increase in catamenial migraine type symptoms.  Has not had any changes to her medication regimen to explain the increase in symptoms.  Does get associated photophobia and nausea.  Is under a fair amount of stress secondary to her parents medical issues

## 2025-08-12 DIAGNOSIS — E55.9 VITAMIN D DEFICIENCY: ICD-10-CM

## 2025-08-12 DIAGNOSIS — E66.3 OVERWEIGHT: ICD-10-CM

## 2025-08-12 DIAGNOSIS — G43.809 OTHER MIGRAINE WITHOUT STATUS MIGRAINOSUS, NOT INTRACTABLE: Primary | ICD-10-CM

## 2025-08-13 DIAGNOSIS — E55.9 VITAMIN D DEFICIENCY: ICD-10-CM

## 2025-08-13 DIAGNOSIS — G43.809 OTHER MIGRAINE WITHOUT STATUS MIGRAINOSUS, NOT INTRACTABLE: ICD-10-CM

## 2025-08-13 DIAGNOSIS — E66.3 OVERWEIGHT: ICD-10-CM

## 2025-08-13 LAB
ALBUMIN: 4 G/DL (ref 3.5–5.2)
ALP BLD-CCNC: 48 U/L (ref 35–104)
ALT SERPL-CCNC: 14 U/L (ref 0–35)
ANION GAP SERPL CALCULATED.3IONS-SCNC: 11 MMOL/L (ref 7–16)
AST SERPL-CCNC: 17 U/L (ref 0–35)
BACTERIA: ABNORMAL
BASOPHILS ABSOLUTE: 0.05 K/UL (ref 0–0.2)
BASOPHILS RELATIVE PERCENT: 1 % (ref 0–2)
BILIRUB SERPL-MCNC: 0.3 MG/DL (ref 0–1.2)
BILIRUBIN DIRECT: 0.1 MG/DL (ref 0–0.2)
BILIRUBIN, INDIRECT: 0.2 MG/DL (ref 0–1)
BILIRUBIN, URINE: NEGATIVE
BUN BLDV-MCNC: 11 MG/DL (ref 6–20)
CALCIUM SERPL-MCNC: 9 MG/DL (ref 8.6–10)
CHLORIDE BLD-SCNC: 106 MMOL/L (ref 98–107)
CHOLESTEROL, TOTAL: 180 MG/DL
CO2: 23 MMOL/L (ref 22–29)
COLOR, UA: YELLOW
CREAT SERPL-MCNC: 0.8 MG/DL (ref 0.5–1)
EOSINOPHILS ABSOLUTE: 0.38 K/UL (ref 0.05–0.5)
EOSINOPHILS RELATIVE PERCENT: 5 % (ref 0–6)
EPITHELIAL CELLS, UA: ABNORMAL /HPF
FOLLICLE STIMULATING HORMONE: 7.1 MIU/ML
GFR, ESTIMATED: 90 ML/MIN/1.73M2
GLUCOSE BLD-MCNC: 100 MG/DL (ref 74–99)
GLUCOSE URINE: NEGATIVE MG/DL
HBA1C MFR BLD: 5.9 % (ref 4–5.6)
HCG QUANTITATIVE: <0.2 MIU/ML (ref 0–7)
HCT VFR BLD CALC: 37.7 % (ref 34–48)
HDLC SERPL-MCNC: 48 MG/DL
HEMOGLOBIN: 12.1 G/DL (ref 11.5–15.5)
IMMATURE GRANULOCYTES %: 0 % (ref 0–5)
IMMATURE GRANULOCYTES ABSOLUTE: <0.03 K/UL (ref 0–0.58)
KETONES, URINE: NEGATIVE MG/DL
LDL CHOLESTEROL: 114 MG/DL
LEUKOCYTE ESTERASE, URINE: ABNORMAL
LH: 8.3 MIU/ML
LYMPHOCYTES ABSOLUTE: 2.1 K/UL (ref 1.5–4)
LYMPHOCYTES RELATIVE PERCENT: 26 % (ref 20–42)
MCH RBC QN AUTO: 27.1 PG (ref 26–35)
MCHC RBC AUTO-ENTMCNC: 32.1 G/DL (ref 32–34.5)
MCV RBC AUTO: 84.5 FL (ref 80–99.9)
MONOCYTES ABSOLUTE: 0.59 K/UL (ref 0.1–0.95)
MONOCYTES RELATIVE PERCENT: 7 % (ref 2–12)
NEUTROPHILS ABSOLUTE: 4.88 K/UL (ref 1.8–7.3)
NEUTROPHILS RELATIVE PERCENT: 61 % (ref 43–80)
NITRITE, URINE: NEGATIVE
PDW BLD-RTO: 13.2 % (ref 11.5–15)
PH, URINE: 6 (ref 5–8)
PLATELET # BLD: 350 K/UL (ref 130–450)
PMV BLD AUTO: 10 FL (ref 7–12)
POTASSIUM SERPL-SCNC: 4.1 MMOL/L (ref 3.5–5.1)
PROTEIN UA: ABNORMAL MG/DL
RBC # BLD: 4.46 M/UL (ref 3.5–5.5)
RBC UA: ABNORMAL /HPF
SODIUM BLD-SCNC: 140 MMOL/L (ref 136–145)
SPECIFIC GRAVITY UA: 1.02 (ref 1–1.03)
T4 FREE: 0.9 NG/DL (ref 0.9–1.7)
TOTAL PROTEIN: 7.3 G/DL (ref 6.4–8.3)
TRIGL SERPL-MCNC: 91 MG/DL
TSH SERPL DL<=0.05 MIU/L-ACNC: 1.93 UIU/ML (ref 0.27–4.2)
TURBIDITY: ABNORMAL
URIC ACID: 4.2 MG/DL (ref 2.4–5.7)
URINE HGB: ABNORMAL
UROBILINOGEN, URINE: 0.2 EU/DL (ref 0–1)
VITAMIN D 25-HYDROXY: 37.7 NG/ML (ref 30–100)
VLDLC SERPL CALC-MCNC: 18 MG/DL
WBC # BLD: 8 K/UL (ref 4.5–11.5)
WBC UA: ABNORMAL /HPF

## 2025-08-15 RX ORDER — CEFDINIR 300 MG/1
300 CAPSULE ORAL 2 TIMES DAILY
Qty: 14 CAPSULE | Refills: 0 | Status: SHIPPED | OUTPATIENT
Start: 2025-08-15 | End: 2025-08-22